# Patient Record
Sex: FEMALE | Race: WHITE
[De-identification: names, ages, dates, MRNs, and addresses within clinical notes are randomized per-mention and may not be internally consistent; named-entity substitution may affect disease eponyms.]

---

## 2018-07-05 ENCOUNTER — HOSPITAL ENCOUNTER (EMERGENCY)
Dept: HOSPITAL 91 - FTE | Age: 23
Discharge: HOME | End: 2018-07-05
Payer: COMMERCIAL

## 2018-07-05 ENCOUNTER — HOSPITAL ENCOUNTER (EMERGENCY)
Age: 23
Discharge: HOME | End: 2018-07-05

## 2018-07-05 DIAGNOSIS — R40.2412: ICD-10-CM

## 2018-07-05 DIAGNOSIS — Z91.018: ICD-10-CM

## 2018-07-05 DIAGNOSIS — K52.29: Primary | ICD-10-CM

## 2018-07-05 LAB
URINE PH (DIP) POC: 7 (ref 5–8.5)
URINE TOTAL PROTEIN POC: (no result)

## 2018-07-05 PROCEDURE — 81025 URINE PREGNANCY TEST: CPT

## 2018-07-05 PROCEDURE — 99283 EMERGENCY DEPT VISIT LOW MDM: CPT

## 2018-07-05 PROCEDURE — 81003 URINALYSIS AUTO W/O SCOPE: CPT

## 2018-07-05 RX ADMIN — ONDANSETRON 1 MG: 4 TABLET, ORALLY DISINTEGRATING ORAL at 19:40

## 2018-07-05 RX ADMIN — ALUMINUM HYDROXIDE, MAGNESIUM HYDROXIDE, AND SIMETHICONE 1 ML: 200; 200; 20 SUSPENSION ORAL at 19:40

## 2019-03-12 ENCOUNTER — HOSPITAL ENCOUNTER (EMERGENCY)
Dept: HOSPITAL 10 - FTE | Age: 24
Discharge: HOME | End: 2019-03-12
Payer: COMMERCIAL

## 2019-03-12 ENCOUNTER — HOSPITAL ENCOUNTER (EMERGENCY)
Dept: HOSPITAL 91 - FTE | Age: 24
Discharge: HOME | End: 2019-03-12
Payer: COMMERCIAL

## 2019-03-12 VITALS — HEART RATE: 80 BPM | RESPIRATION RATE: 18 BRPM | SYSTOLIC BLOOD PRESSURE: 121 MMHG | DIASTOLIC BLOOD PRESSURE: 77 MMHG

## 2019-03-12 VITALS
BODY MASS INDEX: 30.84 KG/M2 | HEIGHT: 61 IN | WEIGHT: 163.36 LBS | BODY MASS INDEX: 30.84 KG/M2 | HEIGHT: 61 IN | WEIGHT: 163.36 LBS

## 2019-03-12 DIAGNOSIS — N39.0: ICD-10-CM

## 2019-03-12 DIAGNOSIS — Z20.2: ICD-10-CM

## 2019-03-12 DIAGNOSIS — N76.0: Primary | ICD-10-CM

## 2019-03-12 LAB
ADD UMIC: YES
UR ASCORBIC ACID: NEGATIVE MG/DL
UR BACTERIA: (no result) /HPF
UR BILIRUBIN (DIP): NEGATIVE MG/DL
UR BLOOD (DIP): (no result) MG/DL
UR CLARITY: (no result)
UR COLOR: YELLOW
UR GLUCOSE (DIP): NEGATIVE MG/DL
UR KETONES (DIP): (no result) MG/DL
UR LEUKOCYTE ESTERASE (DIP): (no result) LEU/UL
UR NITRITE (DIP): NEGATIVE MG/DL
UR PH (DIP): 6 (ref 5–9)
UR RBC: 12 /HPF (ref 0–5)
UR SPECIFIC GRAVITY (DIP): 1.01 (ref 1–1.03)
UR SQUAMOUS EPITHELIAL CELL: (no result) /HPF
UR TOTAL PROTEIN (DIP): NEGATIVE MG/DL
UR UROBILINOGEN (DIP): NEGATIVE MG/DL
UR WBC: 6 /HPF (ref 0–5)
URINE KETONES (DIP) POC: (no result)
URINE LEUKOCYTE EST (DIP) POC: (no result)
URINE PH (DIP) POC: 6.5 (ref 5–8.5)

## 2019-03-12 PROCEDURE — 81025 URINE PREGNANCY TEST: CPT

## 2019-03-12 PROCEDURE — 96372 THER/PROPH/DIAG INJ SC/IM: CPT

## 2019-03-12 PROCEDURE — 87070 CULTURE OTHR SPECIMN AEROBIC: CPT

## 2019-03-12 PROCEDURE — 81001 URINALYSIS AUTO W/SCOPE: CPT

## 2019-03-12 PROCEDURE — 99284 EMERGENCY DEPT VISIT MOD MDM: CPT

## 2019-03-12 PROCEDURE — 81003 URINALYSIS AUTO W/O SCOPE: CPT

## 2019-03-12 PROCEDURE — 87210 SMEAR WET MOUNT SALINE/INK: CPT

## 2019-03-12 PROCEDURE — 87591 N.GONORRHOEAE DNA AMP PROB: CPT

## 2019-03-12 RX ADMIN — AZITHROMYCIN 1 MG: 250 TABLET, FILM COATED ORAL at 08:53

## 2019-03-12 RX ADMIN — CEFTRIAXONE SODIUM 1 MG: 250 INJECTION, POWDER, FOR SOLUTION INTRAMUSCULAR; INTRAVENOUS at 08:53

## 2019-03-12 NOTE — ERD
ER Documentation


Chief Complaint


Chief Complaint





VAGINAL DISCHARGE , BUMPS ON GENITAL AREA





HPI


This is a 23-year-old female  who presents ED with complaints of vaginal 


discharge times 2 days.  Patient recently had a new sexual partner 1 week ago 


and started having vaginal discharge 2 days ago.  Last menstrual period was 


2019.  Patient had unprotected sex with a new partner.  Denies 


fever, chills, vaginal pain, dysuria, hematuria, nausea, vomiting, diarrhea, 


constipation, abdominal pain, melena, hematochezia, hematemesis and other 


symptoms.  No known drug allergies.





ROS


All systems reviewed and are negative except as per history of present illness.





Medications


Home Meds


Active Scripts


Ondansetron (Ondansetron Odt) 4 Mg Tab.rapdis, 4 MG PO Q6H PRN for NAUSEA AND/OR


VOMITING, #10 TAB


   Prov:MUKESH,RONALD         18


Reported Medications


Prenatal Vit W-Ca,Fe,FA(<1 mg) (Prenatal Vitamins) 1 Each Tablet, 1 EACH PO 


DAILY, TAB


   16





Allergies


Allergies:  


Coded Allergies:  


     No Known Allergy (Unverified , 3/12/19)





PMhx/Soc


Medical and Surgical Hx:  pt denies Medical Hx, pt denies Surgical Hx


Hx Alcohol Use:  No


Hx Substance Use:  No


Hx Tobacco Use:  No


Smoking Status:  Never smoker





FmHx


Family History:  No diabetes





Physical Exam


Vitals





Vital Signs


  Date      Temp  Pulse  Resp  B/P (MAP)   Pulse Ox  O2          O2 Flow    FiO2


Time                                                 Delivery    Rate


   3/12/19  98.1     89    18      133/90        99


     07:06                          (104)





Physical Exam


Physical Exam


Vitals signs: Reviewed by me.


General: Well developed, well nourished, in no acute distress. Patient is awake 


and alert.


Head: Normocephalic, atraumatic.


Eyes: Normal conjunctiva, Pupils PERRLA, EOM intact grossly


ENT: Pharynx is clear, Moist mucous membranes, external ears, nose and mouth 


normal


Neck: Supple, no masses, lymphadenopathy or JVD


Respiratory: Clear to auscultation bilaterally with no wheezing, rhonchi, rales,


no distress


Cardiovascular: RRR, no murmurs, rubs, or gallops


Abdominal: Soft, non-tender, non-distended, no peritoneal signs


:


 Pelvic Exam: Chaperone present


 Abdomen:      Nontender


 External Genitalia:   Normal Skin


 Speculum:      Normal vaginal mucosa, there is milky white vaginal discharge, 


faint erythema of cervix present


 Bimanual:       No adnexal masses or tenderness, No CMT


Back: No midline tenderness. No flank tenderness


Neurologic: Alert and oriented, moving all extremities, normal speech, no focal 


weakness, no cerebellar signs. Normal mentation


Skin: warm and dry, No rash


Psych: Normal mood


Results 24 hrs





Laboratory Tests


Test
                                3/12/19
04:45  3/12/19
08:51  3/12/19
08:53


Urine Color                        YELLOW


Urine Clarity
                     SLIGHTLY
CLOUDY  
              



Urine pH                                      6.0


Urine Specific Gravity                      1.006


Urine Ketones                            1+ mg/dL


Urine Nitrite                      NEGATIVE mg/dL


Urine Bilirubin                    NEGATIVE mg/dL


Urine Urobilinogen                 NEGATIVE mg/dL


Urine Leukocyte Esterase                1+ Nathaly/ul


Urine Microscopic RBC                     12 /HPF


Urine Microscopic WBC                      6 /HPF


Urine Squamous Epithelial
Cells    MODERATE /HPF 
  
              



Urine Bacteria                     FEW /HPF


Urine Hemoglobin                         1+ mg/dL


Urine Glucose                      NEGATIVE mg/dL


Urine Total Protein                NEGATIVE mg/dl


Bedside Urine pH (LAB)                                       6.5


Bedside Urine Protein (LAB)                         Negative


Bedside Urine Glucose (UA)                          Negative


Bedside Urine Ketones (LAB)                                   1+


Bedside Urine Blood                                 Trace-lysed


Bedside Urine Nitrite (LAB)                         Negative


Bedside Urine Leukocyte
Esterase   
                         2+ 
  



(L


POC Beta HCG, Qualitative                                          NEGATIVE





Current Medications


 Medications
   Dose
          Sig/Julián
       Start Time
   Status  Last


 (Trade)       Ordered        Route
 PRN     Stop Time              Admin
Dose


                              Reason                                Admin


                1,000 mg       ONCE  STAT
    3/12/19       DC           3/12/19


Azithromycin
                 PO
            08:30
                       08:53



 (Zithromax)                                 3/12/19 08:32


 Ceftriaxone    250 mg         ONCE  STAT
    3/12/19       DC           3/12/19


Sodium
                       IM
            08:30
                       08:53



(Rocephin)                                   3/12/19 08:32








Procedures/MDM








LAB INTERPRETATION:


Urinalysis is remarkable for 12 RBC, 6 RBC, few bacteria, moderate squamous 


epithelial cells, 1+ leukocyte esterase,


Urine pregnancy negative


Wet mount is remarkable for 1+ leukocyte, 1+ white blood cells, 3+ bacteria, 2+ 


epithelial cells, clue cells seen,





ER COURSE:


The patient was given ceftriaxone and azithromycin


The medication was well tolerated and the patient reports improvement in 


symptoms.  


The patient was stable throughout ED course.


I kept the patient and/or family informed of laboratory and diagnostic imaging 


results throughout the emergency room course.





The patient was promptly evaluated and a treatment plan was devised based on H&P


and other data. This plan was discussed with the patient who agreed and had no 


further questions or concerns prior to discharge.





MEDICAL DECISION MAKIN-year-old female presents ED with vaginal discharge times 2 days.  Patient 


recently had a new sexual partner 1 week ago and started experiencing the milky 


white discharge 2 days ago.  Patient was treated for gonorrhea and chlamydia 


prophylactically in the emergency department and given ceftriaxone and 1 g of 


azithromycin.  Gonorrhea and chlamydia testing are pending.  Urinalysis is 


remarkable for leukocyte esterase and bacteria.  Wet mount is remarkable for 


clue cells as well as bacteria.  Will treat patient for UTI as well as bacterial


vaginosis.  Patient was advised to perform safe sex and use protection when 


having intercourse.  There is no cervical motion tenderness and no evidence of 


PID.  At this time there is no genitourinary emergency.  No evidence of tubo-


ovarian abscess, ectopic pregnancy, ovarian torsion, appendicitis, small bowel 


obstruction, perforated viscus, among others.  She was advised to follow-up with


gynecology or go to [facility] to have Pap smear and full STD testing 


performed.  Patient was also advised follow-up with primary care in the next 48 


hours.  Return to ED with any worsening symptoms


 





DISPOSITION PLAN:


We discussed follow up with the patient's primary care doctor within 24 to 48 


hours.  Patient counseled regarding my diagnostic impression and care plan. 


Prior to discharge all questions answered. Pt agrees with treatment plan and 


understands strict return precautions. Precautionary instructions provided 


including instructions to return to the ER if not improving or for any worsening


or changing symptoms or concerns.





SPECIALIST FOLLOW UP RECOMMENDED: OB/GYN, [facility]


Patient has been advised to follow up with primary care in 1-2 days.





Disclaimer: Inadvertent spelling and grammatical errors are likely due to 


EHR/dictation software use and do not reflect on the overall quality of patient 


care. Also, please note that the electronic time recorded on this note does not 


necessarily reflect the actual time of the patient encounter.





Departure


Diagnosis:  


   Primary Impression:  


   Vaginal discharge


   Additional Impressions:  


   Exposure to STD


   UTI (urinary tract infection)


   Urinary tract infection type:  site unspecified  Hematuria presence:  without


   hematuria  Qualified Codes:  N39.0 - Urinary tract infection, site not 


   specified


   Bacterial vaginosis


Condition:  Stable


Patient Instructions:  If You Think You Have an STD, Preventing Vaginitis, 


Understanding Urinary Tract Infections (UTIs), Vaginal Infection: Bacterial 


Vaginosis


Referrals:  


[facility]





OB/GYN REFERRAL LIST





[facility]





Additional Instructions:  


Patient advised to return to the ED immediately for new or worsening symptoms. 


Patient advised to follow up with primary care provider in the next 24-48 hours.


Patient verbalized understanding and agrees with treatment plan and course of 


action.





If patient has no primary care they may follow up with one of the [facility] 


clinics listed on the following page or one of the options listed below








[facility]


[address] [place] [id number]





or





[facility]


[address] [place] [address]





or





[facility]


[address] [place] [address]











NICOLA BRAGA PA-C          Mar 12, 2019 09:08

## 2019-03-14 LAB — LABORATORY COMMENT REPORT: (no result)
